# Patient Record
Sex: FEMALE | Race: WHITE | NOT HISPANIC OR LATINO
[De-identification: names, ages, dates, MRNs, and addresses within clinical notes are randomized per-mention and may not be internally consistent; named-entity substitution may affect disease eponyms.]

---

## 2019-09-22 PROBLEM — Z00.00 ENCOUNTER FOR PREVENTIVE HEALTH EXAMINATION: Status: ACTIVE | Noted: 2019-09-22

## 2019-10-09 ENCOUNTER — APPOINTMENT (OUTPATIENT)
Dept: PAIN MANAGEMENT | Facility: CLINIC | Age: 64
End: 2019-10-09

## 2019-10-10 ENCOUNTER — APPOINTMENT (OUTPATIENT)
Dept: GASTROENTEROLOGY | Facility: CLINIC | Age: 64
End: 2019-10-10

## 2020-06-11 ENCOUNTER — APPOINTMENT (OUTPATIENT)
Dept: GASTROENTEROLOGY | Facility: CLINIC | Age: 65
End: 2020-06-11

## 2020-06-11 VITALS
WEIGHT: 160 LBS | SYSTOLIC BLOOD PRESSURE: 100 MMHG | DIASTOLIC BLOOD PRESSURE: 70 MMHG | TEMPERATURE: 98.8 F | HEIGHT: 64 IN | HEART RATE: 75 BPM | OXYGEN SATURATION: 97 % | BODY MASS INDEX: 27.31 KG/M2

## 2020-08-13 ENCOUNTER — APPOINTMENT (OUTPATIENT)
Dept: SURGERY | Facility: CLINIC | Age: 65
End: 2020-08-13
Payer: MEDICAID

## 2020-08-13 VITALS
BODY MASS INDEX: 27.31 KG/M2 | HEIGHT: 64 IN | SYSTOLIC BLOOD PRESSURE: 118 MMHG | RESPIRATION RATE: 16 BRPM | DIASTOLIC BLOOD PRESSURE: 74 MMHG | WEIGHT: 160 LBS | HEART RATE: 87 BPM

## 2020-08-13 DIAGNOSIS — Z87.19 PERSONAL HISTORY OF OTHER DISEASES OF THE DIGESTIVE SYSTEM: ICD-10-CM

## 2020-08-13 DIAGNOSIS — Z98.890 OTHER SPECIFIED POSTPROCEDURAL STATES: ICD-10-CM

## 2020-08-13 PROCEDURE — 99204 OFFICE O/P NEW MOD 45 MIN: CPT

## 2020-08-14 PROBLEM — Z98.890 H/O COLONOSCOPY: Status: RESOLVED | Noted: 2020-08-13 | Resolved: 2020-08-14

## 2020-08-14 PROBLEM — Z87.19 HISTORY OF DIVERTICULITIS: Status: RESOLVED | Noted: 2020-08-13 | Resolved: 2020-08-14

## 2020-08-14 RX ORDER — TOPIRAMATE 25 MG/1
25 TABLET, COATED ORAL
Refills: 0 | Status: ACTIVE | COMMUNITY

## 2020-08-21 ENCOUNTER — APPOINTMENT (OUTPATIENT)
Dept: OBGYN | Facility: CLINIC | Age: 65
End: 2020-08-21
Payer: MEDICAID

## 2020-08-21 VITALS
HEIGHT: 64.5 IN | WEIGHT: 160 LBS | SYSTOLIC BLOOD PRESSURE: 120 MMHG | BODY MASS INDEX: 26.98 KG/M2 | DIASTOLIC BLOOD PRESSURE: 80 MMHG

## 2020-08-21 DIAGNOSIS — Z80.0 FAMILY HISTORY OF MALIGNANT NEOPLASM OF DIGESTIVE ORGANS: ICD-10-CM

## 2020-08-21 DIAGNOSIS — Z80.7 FAMILY HISTORY OF OTHER MALIGNANT NEOPLASMS OF LYMPHOID, HEMATOPOIETIC AND RELATED TISSUES: ICD-10-CM

## 2020-08-21 DIAGNOSIS — Z78.9 OTHER SPECIFIED HEALTH STATUS: ICD-10-CM

## 2020-08-21 DIAGNOSIS — N83.209 UNSPECIFIED OVARIAN CYST, UNSPECIFIED SIDE: ICD-10-CM

## 2020-08-21 PROCEDURE — 99203 OFFICE O/P NEW LOW 30 MIN: CPT

## 2020-08-21 RX ORDER — LINACLOTIDE 72 UG/1
72 CAPSULE, GELATIN COATED ORAL
Refills: 0 | Status: ACTIVE | COMMUNITY

## 2020-08-21 RX ORDER — OXYCODONE HYDROCHLORIDE AND ACETAMINOPHEN 5; 325 MG/1; MG/1
5-325 TABLET ORAL
Refills: 0 | Status: ACTIVE | COMMUNITY

## 2020-08-21 RX ORDER — LEVOTHYROXINE SODIUM 75 UG/1
75 TABLET ORAL
Refills: 0 | Status: ACTIVE | COMMUNITY

## 2020-08-25 NOTE — ASSESSMENT
[FreeTextEntry1] : 65 F with prior sigmoid colectomy for diverticulitis now with recurrent diverticulitis in descending colon. Patient has had several attacks this year with short time intervals between. SIgnificantly affecting her quality of life. \par \par Discussed options for management of diverticulitis being medical vs surgical. Given the frequency and severity of her attacks recommend partial colectomy to remove involved portion of colon and residual sigmoid from prior surgery. Patient appears to have had redundant colon at time of initial surgery and splenic flexure is still in normal anatomic position so should have adequate length for primary anastomosis. \par \par DIscussed role of laparoscopy and potential need for open incision given her prior open surgical history. \par \par Discussed risks and benefits including bleeding, infection and anastomotic complications including risk of stoma. \par Patient signed consent for surgery, will plan for 9/16/20. \par \par Bowel prep and preop instructions provided to patient.

## 2020-08-25 NOTE — HISTORY OF PRESENT ILLNESS
[FreeTextEntry1] : 65 F with prior history of sigmoid diverticulitis. Patient underwent sigmoid colectomy several years ago. Since then had an abdominoplasty to remove scars. Patient has more recently had several recurrent attacks of diverticulitis, now mostly in the descending colon proximal to prior anastomosis. Patient's most recent attack was approximately 3 weeks ago and completed a course of antibiotics. \par \par Today patient feels well, no complaints of pain. Patient did have a large ovarian cyst on imaging which she believes is symptomatic as well. \par Last colonoscopy approximately 3 years ago, very small polyps present. \par

## 2020-08-25 NOTE — PHYSICAL EXAM
[Abdomen Masses] : No abdominal masses [Abdomen Tenderness] : ~T No ~M abdominal tenderness [Exam Deferred] : exam was deferred [JVD] : no jugular venous distention  [Normal Breath Sounds] : Normal breath sounds [Respiratory Effort] : normal respiratory effort [Normal Rate and Rhythm] : normal rate and rhythm [de-identified] : Well healed scars, nondistended [de-identified] : No acute distress

## 2020-08-25 NOTE — DATA REVIEWED
[FreeTextEntry1] : CT abdomen and pelvis with prior anastomosis in sigmoid colon, acute inflammation in descending colon. Splenic flexure appears to be in normal anatomic position.

## 2020-08-28 ENCOUNTER — RESULT REVIEW (OUTPATIENT)
Age: 65
End: 2020-08-28

## 2020-09-01 ENCOUNTER — RESULT REVIEW (OUTPATIENT)
Age: 65
End: 2020-09-01

## 2020-09-11 ENCOUNTER — RESULT REVIEW (OUTPATIENT)
Age: 65
End: 2020-09-11

## 2020-09-16 ENCOUNTER — RESULT REVIEW (OUTPATIENT)
Age: 65
End: 2020-09-16

## 2020-09-16 ENCOUNTER — APPOINTMENT (OUTPATIENT)
Dept: SURGERY | Facility: HOSPITAL | Age: 65
End: 2020-09-16

## 2020-09-20 ENCOUNTER — RESULT REVIEW (OUTPATIENT)
Age: 65
End: 2020-09-20

## 2020-09-21 ENCOUNTER — RESULT REVIEW (OUTPATIENT)
Age: 65
End: 2020-09-21

## 2020-09-24 ENCOUNTER — RESULT REVIEW (OUTPATIENT)
Age: 65
End: 2020-09-24

## 2020-10-20 ENCOUNTER — APPOINTMENT (OUTPATIENT)
Dept: SURGERY | Facility: CLINIC | Age: 65
End: 2020-10-20
Payer: MEDICAID

## 2020-10-20 VITALS
DIASTOLIC BLOOD PRESSURE: 59 MMHG | SYSTOLIC BLOOD PRESSURE: 105 MMHG | HEIGHT: 64.5 IN | HEART RATE: 75 BPM | OXYGEN SATURATION: 100 % | BODY MASS INDEX: 25.46 KG/M2 | WEIGHT: 151 LBS

## 2020-10-20 DIAGNOSIS — K57.32 DIVERTICULITIS OF LARGE INTESTINE W/OUT PERFORATION OR ABSCESS W/OUT BLEEDING: ICD-10-CM

## 2020-10-20 PROCEDURE — 99024 POSTOP FOLLOW-UP VISIT: CPT

## 2020-10-20 NOTE — ASSESSMENT
[FreeTextEntry1] : 65 F s/p laparoscopic subtotal colectomy with ileorectal anastomosis. \par \par Patient overall doing well, still with some crampy pain and some reflux symptoms. eating enough and slowly regaining weight. \par \par Plan:\par Return to regular diet slowly as tolerated, adding fiber back in to diet. \par Will monitor heartburn symptoms for now, consider PPI if not improving. \par Can return to normal physical activity, explained that will be normal to have days where she is tired but should improve week to week given her prolonged hospitalization. \par \par Plan for flexible sigmoidoscopy in 4 months to assess anastomosis and ensure rectum free of polyps.

## 2020-10-20 NOTE — HISTORY OF PRESENT ILLNESS
[FreeTextEntry1] : 65 F here for initial post-op visit. Patient initially seen for recurrent sigmoid diverticulitis in setting of pan diverticulosis. Patient was subsequently admitted with new pain and a finding of transverse colon diverticulitis. Given these findings, multifocal diverticulitis and a prior sigmoid colectomy for diverticulitis, recommended the patient undergo subtotal colectomy with ileorectal anastomosis. Patient had laparoscopic subtotal colectomy with ileorectal. Recovery complicated by prolonged post-op ileus. Eventually bowel function recovered and patient tolerated diet allowing for discharge. Patient now almost 2 months out from surgery, doing well, eating and drinking, maintaining her weight although she is still down from pre-admission weight. Moving her bowels 4-5 times daily as expected. Still had some crampy abdominal pain, but not taking pain medications currently. Has some reflux symptoms, taking tums, no PPI use.

## 2020-10-20 NOTE — PHYSICAL EXAM
[Exam Deferred] : exam was deferred [JVD] : no jugular venous distention  [Respiratory Effort] : normal respiratory effort [No Rash or Lesion] : No rash or lesion [Alert] : alert [Calm] : calm [de-identified] : Mild mid abdominal tenderness, nondistended, incisions healed, no erythema. [de-identified] : no acute distress, well appearing

## 2021-03-04 ENCOUNTER — APPOINTMENT (OUTPATIENT)
Dept: SURGERY | Facility: CLINIC | Age: 66
End: 2021-03-04
Payer: MEDICAID

## 2021-03-04 VITALS
OXYGEN SATURATION: 95 % | RESPIRATION RATE: 16 BRPM | SYSTOLIC BLOOD PRESSURE: 125 MMHG | WEIGHT: 151 LBS | DIASTOLIC BLOOD PRESSURE: 70 MMHG | BODY MASS INDEX: 25.46 KG/M2 | HEIGHT: 64.5 IN | HEART RATE: 94 BPM

## 2021-03-04 DIAGNOSIS — R19.7 DIARRHEA, UNSPECIFIED: ICD-10-CM

## 2021-03-04 PROCEDURE — 99072 ADDL SUPL MATRL&STAF TM PHE: CPT

## 2021-03-04 PROCEDURE — 99212 OFFICE O/P EST SF 10 MIN: CPT

## 2021-03-05 NOTE — ASSESSMENT
[FreeTextEntry1] : 65 F here for follow up after subtotal colectomy with ileorectal anastomosis, now with loose bowel movements, some abdominal pain. \par \par Will start with labs and CT abd/pelvis, unclear etiology of patient's symptoms. Possible to have diverticulitis at anastomosis if residual diverticular disease. Unclear why prolonged diarrhea now, and abdominal pain, possible bacterial enteritis?\par \par Will get stool cultures/studies, lab tests and CT to further investigate. \par Patient aware she should go to ER if worsens pain, dehydration etc. \par \par Will follow up results and direct further work-up from there, if unrevealing consider GI consult to assess further.

## 2021-03-05 NOTE — PHYSICAL EXAM
[Abdomen Masses] : No abdominal masses [Respiratory Effort] : normal respiratory effort [Normal Rate and Rhythm] : normal rate and rhythm [No Rash or Lesion] : No rash or lesion [Alert] : alert [Calm] : calm [de-identified] : Well healed scars, mildly tender left lower quadrant, nondistended [de-identified] : No acute distress

## 2021-03-05 NOTE — HISTORY OF PRESENT ILLNESS
[FreeTextEntry1] : 65 F here for followup. Previously underwent laparoscopic subtotal colectomy with ileorectal anastomosis for multifocal diverticulitis. Hospitalization complicated by prolonged post-operative ileus. \par \par Subsequently did well, diet returned mostly to normal, bowel movements less and less frequent 4-5 times daily. \par \par THen 10-12 days ago had looser bowel movements, unable to control them. Started feeling dehydrated, some left lower quadrant abdominal pain. NO fevers or chills. Feeling some heartburn and stomach upset as well.

## 2021-03-23 ENCOUNTER — RESULT REVIEW (OUTPATIENT)
Age: 66
End: 2021-03-23

## 2021-04-14 LAB
ALBUMIN SERPL ELPH-MCNC: 4.6 G/DL
ALP BLD-CCNC: 100 U/L
ALT SERPL-CCNC: 12 U/L
ANION GAP SERPL CALC-SCNC: 15 MMOL/L
AST SERPL-CCNC: 16 U/L
BASOPHILS # BLD AUTO: 0.03 K/UL
BASOPHILS NFR BLD AUTO: 0.7 %
BILIRUB SERPL-MCNC: 0.5 MG/DL
BUN SERPL-MCNC: 19 MG/DL
CALCIUM SERPL-MCNC: 9.9 MG/DL
CHLORIDE SERPL-SCNC: 106 MMOL/L
CO2 SERPL-SCNC: 21 MMOL/L
CREAT SERPL-MCNC: 0.72 MG/DL
EOSINOPHIL # BLD AUTO: 0.04 K/UL
EOSINOPHIL NFR BLD AUTO: 1 %
GLUCOSE SERPL-MCNC: 90 MG/DL
HCT VFR BLD CALC: 40.4 %
HGB BLD-MCNC: 13.1 G/DL
IMM GRANULOCYTES NFR BLD AUTO: 0 %
LYMPHOCYTES # BLD AUTO: 1.86 K/UL
LYMPHOCYTES NFR BLD AUTO: 45.8 %
MAN DIFF?: NORMAL
MCHC RBC-ENTMCNC: 28.7 PG
MCHC RBC-ENTMCNC: 32.4 GM/DL
MCV RBC AUTO: 88.4 FL
MONOCYTES # BLD AUTO: 0.46 K/UL
MONOCYTES NFR BLD AUTO: 11.3 %
NEUTROPHILS # BLD AUTO: 1.67 K/UL
NEUTROPHILS NFR BLD AUTO: 41.2 %
PLATELET # BLD AUTO: 231 K/UL
POTASSIUM SERPL-SCNC: 4.2 MMOL/L
PROT SERPL-MCNC: 7.4 G/DL
RBC # BLD: 4.57 M/UL
RBC # FLD: 13 %
SODIUM SERPL-SCNC: 142 MMOL/L
WBC # FLD AUTO: 4.06 K/UL

## 2022-08-17 ENCOUNTER — APPOINTMENT (OUTPATIENT)
Dept: BARIATRICS/WEIGHT MGMT | Facility: CLINIC | Age: 67
End: 2022-08-17

## 2022-08-17 ENCOUNTER — NON-APPOINTMENT (OUTPATIENT)
Age: 67
End: 2022-08-17

## 2022-08-17 VITALS — HEIGHT: 64.5 IN | BODY MASS INDEX: 29.51 KG/M2 | WEIGHT: 175 LBS

## 2022-08-17 PROCEDURE — 97802 MEDICAL NUTRITION INDIV IN: CPT

## 2022-09-12 ENCOUNTER — APPOINTMENT (OUTPATIENT)
Dept: BARIATRICS/WEIGHT MGMT | Facility: CLINIC | Age: 67
End: 2022-09-12

## 2022-09-20 ENCOUNTER — NON-APPOINTMENT (OUTPATIENT)
Age: 67
End: 2022-09-20

## 2022-09-20 ENCOUNTER — APPOINTMENT (OUTPATIENT)
Dept: CARDIOLOGY | Facility: CLINIC | Age: 67
End: 2022-09-20

## 2022-09-20 VITALS
TEMPERATURE: 97.6 F | SYSTOLIC BLOOD PRESSURE: 105 MMHG | HEART RATE: 95 BPM | DIASTOLIC BLOOD PRESSURE: 65 MMHG | BODY MASS INDEX: 28.43 KG/M2 | WEIGHT: 168.56 LBS | OXYGEN SATURATION: 98 % | HEIGHT: 64.5 IN

## 2022-09-20 PROCEDURE — 93246 EXT ECG>7D<15D RECORDING: CPT

## 2022-09-20 PROCEDURE — 93000 ELECTROCARDIOGRAM COMPLETE: CPT | Mod: 59

## 2022-09-20 PROCEDURE — 93242 EXT ECG>48HR<7D RECORDING: CPT

## 2022-09-20 PROCEDURE — 99214 OFFICE O/P EST MOD 30 MIN: CPT | Mod: 25

## 2022-09-20 RX ORDER — FUROSEMIDE 40 MG/1
40 TABLET ORAL
Qty: 30 | Refills: 1 | Status: ACTIVE | COMMUNITY
Start: 2022-09-20

## 2022-10-12 PROCEDURE — 93248 EXT ECG>7D<15D REV&INTERPJ: CPT

## 2022-10-16 PROBLEM — R53.83 FATIGUE: Status: ACTIVE | Noted: 2022-09-20

## 2022-10-16 PROBLEM — R60.9 EDEMA: Status: ACTIVE | Noted: 2022-09-20

## 2022-10-16 PROBLEM — R00.2 PALPITATIONS: Status: ACTIVE | Noted: 2022-09-20

## 2022-10-16 PROBLEM — R07.89 CHEST DISCOMFORT: Status: ACTIVE | Noted: 2022-09-20

## 2022-10-18 ENCOUNTER — APPOINTMENT (OUTPATIENT)
Dept: CARDIOLOGY | Facility: CLINIC | Age: 67
End: 2022-10-18

## 2022-10-18 DIAGNOSIS — R60.9 EDEMA, UNSPECIFIED: ICD-10-CM

## 2022-10-18 DIAGNOSIS — R07.89 OTHER CHEST PAIN: ICD-10-CM

## 2022-10-18 DIAGNOSIS — R00.2 PALPITATIONS: ICD-10-CM

## 2022-10-18 DIAGNOSIS — R53.83 OTHER FATIGUE: ICD-10-CM

## 2023-07-17 ENCOUNTER — TRANSCRIPTION ENCOUNTER (OUTPATIENT)
Age: 68
End: 2023-07-17

## 2023-07-18 ENCOUNTER — TRANSCRIPTION ENCOUNTER (OUTPATIENT)
Age: 68
End: 2023-07-18

## 2023-12-27 ENCOUNTER — TRANSCRIPTION ENCOUNTER (OUTPATIENT)
Age: 68
End: 2023-12-27

## 2024-01-02 ENCOUNTER — APPOINTMENT (OUTPATIENT)
Dept: SURGERY | Facility: CLINIC | Age: 69
End: 2024-01-02
Payer: MEDICARE

## 2024-01-02 VITALS
SYSTOLIC BLOOD PRESSURE: 139 MMHG | DIASTOLIC BLOOD PRESSURE: 89 MMHG | BODY MASS INDEX: 28.16 KG/M2 | OXYGEN SATURATION: 99 % | HEIGHT: 64.5 IN | HEART RATE: 87 BPM | WEIGHT: 167 LBS | RESPIRATION RATE: 18 BRPM

## 2024-01-02 PROCEDURE — 99214 OFFICE O/P EST MOD 30 MIN: CPT

## 2024-01-04 NOTE — PHYSICAL EXAM
[Abdomen Masses] : No abdominal masses [JVD] : no jugular venous distention  [Respiratory Effort] : normal respiratory effort [No Rash or Lesion] : No rash or lesion [Alert] : alert [Calm] : calm [de-identified] : Mild right lower quadrant tenderness on exam [de-identified] : No acute distress

## 2024-01-04 NOTE — ASSESSMENT
[FreeTextEntry1] : 68-year-old female here for follow-up after prior laparoscopic subtotal colectomy with ileorectal anastomosis for multifocal diverticulitis.  Patient continues to have recurrent abdominal pain symptoms.  On imaging she frequently has a dilated blind end to her terminal ileum beyond the iliac to anastomosis.  She seems to be dilating the small bowel giving cramping symptoms and potentially a ball-valve effect leading to obstructive symptoms.  Discussed surgical options with the patient including robotic assisted small bowel resection to excise the blind end of the pouch.  Alternatively the anastomosis could be revised to a end-to-end anastomosis although this can be difficult with the size mismatch of small bowel to rectum.  Will plan for robotic assisted small bowel excision to remove the dilated end of the pouch and try to leave the original anastomosis intact as it is healed without issue.  Discussed the use of flexible sigmoidoscopy during the case to assess the anastomosis viability.  Patient in agreement to proceed with surgery as discussed.  She does not need a bowel prep and she does not have any residual colon at this point.  Can be on a clear liquid diet the day prior to surgery and consider rectal enema to clear out from below.  Patient will get presurgical testing and assessment as needed.

## 2024-01-04 NOTE — HISTORY OF PRESENT ILLNESS
[FreeTextEntry1] : Six 8-year-old female returns for follow-up after prior subtotal colectomy with ileorectal anastomosis for multifocal diverticular disease.  Patient did well initially for the first couple years but is more recently been having recurring left lower quadrant pain.  She was admitted to [facility] briefly over the summer with a CT scan that showed some dilation of the blind end of the ileum and questionable obstruction.  She underwent flexible sigmoidoscopy which did not show any issues with the anastomosis at that time or evidence of inflammation.  She more recently was admitted to [facility] where she once again underwent CT abdomen pelvis which showed this same dilated distal end of the ileum with some wall thickening potentially consistent with some inflammation.  She notes recurring left lower quadrant pain and pressure that is crampy in nature.  She otherwise remains healthy.  Has been having looser bowel movements while this is going on.

## 2024-01-09 ENCOUNTER — RESULT REVIEW (OUTPATIENT)
Age: 69
End: 2024-01-09

## 2024-01-09 ENCOUNTER — APPOINTMENT (OUTPATIENT)
Dept: SURGERY | Facility: HOSPITAL | Age: 69
End: 2024-01-09
Payer: MEDICARE

## 2024-01-09 PROCEDURE — S2900 ROBOTIC SURGICAL SYSTEM: CPT | Mod: NC

## 2024-01-09 PROCEDURE — 44120 REMOVAL OF SMALL INTESTINE: CPT

## 2024-01-16 ENCOUNTER — TRANSCRIPTION ENCOUNTER (OUTPATIENT)
Age: 69
End: 2024-01-16

## 2024-01-23 ENCOUNTER — APPOINTMENT (OUTPATIENT)
Dept: SURGERY | Facility: CLINIC | Age: 69
End: 2024-01-23
Payer: MEDICARE

## 2024-01-23 VITALS
BODY MASS INDEX: 27.69 KG/M2 | WEIGHT: 164.2 LBS | RESPIRATION RATE: 18 BRPM | HEIGHT: 64.5 IN | OXYGEN SATURATION: 97 % | SYSTOLIC BLOOD PRESSURE: 128 MMHG | HEART RATE: 92 BPM | DIASTOLIC BLOOD PRESSURE: 74 MMHG

## 2024-01-23 DIAGNOSIS — Z98.890 OTHER SPECIFIED POSTPROCEDURAL STATES: ICD-10-CM

## 2024-01-23 PROCEDURE — 99024 POSTOP FOLLOW-UP VISIT: CPT

## 2024-01-31 ENCOUNTER — APPOINTMENT (OUTPATIENT)
Dept: SURGERY | Facility: CLINIC | Age: 69
End: 2024-01-31

## 2024-01-31 VITALS
HEIGHT: 64.5 IN | BODY MASS INDEX: 27.66 KG/M2 | WEIGHT: 164 LBS | RESPIRATION RATE: 16 BRPM | SYSTOLIC BLOOD PRESSURE: 116 MMHG | DIASTOLIC BLOOD PRESSURE: 70 MMHG | HEART RATE: 96 BPM | OXYGEN SATURATION: 97 %

## 2024-02-14 ENCOUNTER — TRANSCRIPTION ENCOUNTER (OUTPATIENT)
Age: 69
End: 2024-02-14

## 2024-03-15 ENCOUNTER — APPOINTMENT (OUTPATIENT)
Dept: SURGERY | Facility: CLINIC | Age: 69
End: 2024-03-15
Payer: MEDICARE

## 2024-03-15 VITALS
BODY MASS INDEX: 28.16 KG/M2 | DIASTOLIC BLOOD PRESSURE: 61 MMHG | HEIGHT: 64.5 IN | SYSTOLIC BLOOD PRESSURE: 130 MMHG | WEIGHT: 167 LBS | HEART RATE: 82 BPM

## 2024-03-15 DIAGNOSIS — R10.32 LEFT LOWER QUADRANT PAIN: ICD-10-CM

## 2024-03-15 DIAGNOSIS — K63.9 DISEASE OF INTESTINE, UNSPECIFIED: ICD-10-CM

## 2024-03-15 LAB
ANION GAP SERPL CALC-SCNC: 13 MMOL/L
BUN SERPL-MCNC: 15 MG/DL
CALCIUM SERPL-MCNC: 9.6 MG/DL
CHLORIDE SERPL-SCNC: 104 MMOL/L
CO2 SERPL-SCNC: 22 MMOL/L
CREAT SERPL-MCNC: 0.84 MG/DL
EGFR: 76 ML/MIN/1.73M2
GLUCOSE SERPL-MCNC: 95 MG/DL
POTASSIUM SERPL-SCNC: 4.3 MMOL/L
SODIUM SERPL-SCNC: 139 MMOL/L

## 2024-03-15 PROCEDURE — 99024 POSTOP FOLLOW-UP VISIT: CPT

## 2024-03-15 NOTE — ASSESSMENT
[FreeTextEntry1] : 69-year-old female here for postop visit after robotic assisted small bowel resection.  Patient continues to have persistent pain.  Pain is located in the right midabdomen.  I do not appreciate a hernia on exam today.  Palpable she has a small fat-containing hernia that is difficult to appreciate on exam.  Given persistent pain patient would like to proceed with CT abdomen pelvis to further assess.  Will get basic metabolic profile to confirm kidney function is okay for IV contrast.  Recommend over-the-counter antidiarrheal medication to see if this improves bowel function/frequency.  Follow-up after CT scan.

## 2024-03-15 NOTE — PHYSICAL EXAM
[Abdomen Masses] : No abdominal masses [JVD] : no jugular venous distention  [Respiratory Effort] : normal respiratory effort [No Rash or Lesion] : No rash or lesion [Alert] : alert [Calm] : calm [de-identified] : RIght mid abdomen tenderness, no appreciable hernia on exam.  [de-identified] : No acute distress noted

## 2024-03-15 NOTE — HISTORY OF PRESENT ILLNESS
[FreeTextEntry1] : 69 year old female 2 months out from robotic assisted small bowel resection, removing redundant end of ileorectal anastomosis. Slowly improving but still having persistent right sided pain. Mostly right mid abdomen. Feels a twisting sensation. Pain is daily. Eating and drinking ok. Still having issues with frequency of bowel movements/continence.

## 2024-04-10 ENCOUNTER — NON-APPOINTMENT (OUTPATIENT)
Age: 69
End: 2024-04-10

## 2024-04-24 PROBLEM — Z98.890 POST-OPERATIVE STATE: Status: ACTIVE | Noted: 2024-04-24

## 2024-04-24 NOTE — HISTORY OF PRESENT ILLNESS
[FreeTextEntry1] : C9-year[age] female here for postop visit after robotic assisted small bowel resection for inflamed portion of small bowel at the prior ilio rectal anastomosis.  Patient overall doing okay.  Still has resolving abdominal pain from surgery.  Significant incisional pain and right sided pain.  Eating and drink without issue.  Moving her bowels.  Loose bowel movement still.  No fevers.

## 2024-04-24 NOTE — PHYSICAL EXAM
[Abdomen Masses] : No abdominal masses [JVD] : no jugular venous distention  [Respiratory Effort] : normal respiratory effort [No Rash or Lesion] : No rash or lesion [Alert] : alert [Calm] : calm [de-identified] : Incisions clean dry intact. [de-identified] : No acute distress

## 2024-04-24 NOTE — ASSESSMENT
[FreeTextEntry1] : 69-year-old female who postoperative robotic assisted resection of redundant end of ileorectal anastomosis.  Patient overall seems to be doing okay.  She still has a persistent postoperative pain mostly at the extraction site.  Recommended continuing with regular diet as tolerated.  Pain control as needed.